# Patient Record
Sex: MALE | Race: WHITE | NOT HISPANIC OR LATINO
[De-identification: names, ages, dates, MRNs, and addresses within clinical notes are randomized per-mention and may not be internally consistent; named-entity substitution may affect disease eponyms.]

---

## 2020-02-04 ENCOUNTER — TRANSCRIPTION ENCOUNTER (OUTPATIENT)
Age: 25
End: 2020-02-04

## 2022-08-30 ENCOUNTER — APPOINTMENT (OUTPATIENT)
Dept: CARDIOLOGY | Facility: CLINIC | Age: 27
End: 2022-08-30

## 2022-08-30 VITALS
HEIGHT: 73 IN | SYSTOLIC BLOOD PRESSURE: 112 MMHG | TEMPERATURE: 97.7 F | OXYGEN SATURATION: 95 % | BODY MASS INDEX: 23.19 KG/M2 | HEART RATE: 63 BPM | DIASTOLIC BLOOD PRESSURE: 62 MMHG | WEIGHT: 175 LBS

## 2022-08-30 VITALS — DIASTOLIC BLOOD PRESSURE: 60 MMHG | SYSTOLIC BLOOD PRESSURE: 100 MMHG

## 2022-08-30 VITALS — SYSTOLIC BLOOD PRESSURE: 120 MMHG | DIASTOLIC BLOOD PRESSURE: 60 MMHG

## 2022-08-30 DIAGNOSIS — Z78.9 OTHER SPECIFIED HEALTH STATUS: ICD-10-CM

## 2022-08-30 DIAGNOSIS — Z13.6 ENCOUNTER FOR SCREENING FOR CARDIOVASCULAR DISORDERS: ICD-10-CM

## 2022-08-30 DIAGNOSIS — Z80.0 FAMILY HISTORY OF MALIGNANT NEOPLASM OF DIGESTIVE ORGANS: ICD-10-CM

## 2022-08-30 DIAGNOSIS — Z00.00 ENCOUNTER FOR GENERAL ADULT MEDICAL EXAMINATION W/OUT ABNORMAL FINDINGS: ICD-10-CM

## 2022-08-30 DIAGNOSIS — F12.90 CANNABIS USE, UNSPECIFIED, UNCOMPLICATED: ICD-10-CM

## 2022-08-30 PROCEDURE — 99204 OFFICE O/P NEW MOD 45 MIN: CPT | Mod: 25

## 2022-08-30 PROCEDURE — 93000 ELECTROCARDIOGRAM COMPLETE: CPT

## 2022-08-30 NOTE — PHYSICAL EXAM
[Well Developed] : well developed [Well Nourished] : well nourished [No Acute Distress] : no acute distress [No Carotid Bruit] : no carotid bruit [Normal S1, S2] : normal S1, S2 [No Murmur] : no murmur [No Rub] : no rub [No Gallop] : no gallop [Clear Lung Fields] : clear lung fields [Good Air Entry] : good air entry [No Respiratory Distress] : no respiratory distress  [Soft] : abdomen soft [Non Tender] : non-tender [No Masses/organomegaly] : no masses/organomegaly [Normal Bowel Sounds] : normal bowel sounds [Normal Gait] : normal gait [No Edema] : no edema [No Cyanosis] : no cyanosis [No Clubbing] : no clubbing [No Varicosities] : no varicosities [Normal Radial B/L] : normal radial B/L [Normal DP B/L] : normal DP B/L [Moves all extremities] : moves all extremities [Normal Speech] : normal speech [Alert and Oriented] : alert and oriented

## 2022-08-30 NOTE — DISCUSSION/SUMMARY
[FreeTextEntry1] : 26-year-old gentleman with above medical history active medical problems as noted below\par 1.  Postural dizziness.  Orthostatic blood pressure shifts.  Longstanding history.  Also element of vasovagal physiology.\par Reviewed pathophysiology with him at length.  And his mother.\par Recommended hydration.  Liberalization of the salt intake.\par Upper body activity exercise before getting up.\par Avoid quick changes in position\par If symptoms arrives lie down and relax till symptoms passes.\par Avoid alcohol marijuana as much as possible.\par We will have 7-day ZIO\par Echocardiogram to rule out structural heart disease.\par Carotid Doppler study to rule out any vertebral insufficiency.\par Exercise treadmill stress test to rule out any significant blood pressure heart rate shifts and arrhythmia.\par Labs.\par \par Reviewed all the above with them at length.  Answered all their questions.\par Counseling regarding low saturated fat, salt and carbohydrate intake was reviewed. Active lifestyle and regular. Exercise along with weight management is advised.\par All the above were at length reviewed. Answered all the questions. Thank you very much for this kind referral. Please do not hesitate to give me a call for any question.\par Part of this transcription was done with voice recognition software and phonetically similar errors are common. I apologize for that. Please do not hesitate to call for any questions due to above.\par \par Sincerely,\par Ab Bonilla MD,FACC,KIMBERLY\par

## 2022-08-30 NOTE — ASSESSMENT
[FreeTextEntry1] : Reviewed August 30, 2022\par EKG as noted above.\par Chest x-ray August 16, 2022.  No pleural effusion or focal consolidation.  Normal mediastinum.  Labs had shown stable CBC CMP.

## 2022-08-30 NOTE — REASON FOR VISIT
[Symptom and Test Evaluation] : symptom and test evaluation [Parent] : parent [FreeTextEntry3] : Dr. Ledy Bonilla [FreeTextEntry1] : 26-year-old white male is referred to me for consultation with longstanding history of postural dizziness and intermittent syncope.  He had about events over time.  Few weeks ago he passed out after waking up and getting up.  He did not injure himself.  He was mildly confused.  With contusion of left side of the head.  And right knee.\par He denied any palpitation.  No headache visual disturbances focal weakness\par According to him there is no relation to any meals or respiration.  He tries to hydrate himself and eats more salt.\par There is no relation to alcohol marijuana or caffeine.\par He is non-smoker\par He has this history for the last 8 years\par He has been doing free diving/.  Fishing recently without any syncopal event.\par He tries to do aggressive activity and exercise on a regular basis without any significant events.\par He has had intermittent right precordial chest pain.  Achiness.  Resolves on its own.  Nonexertional.\par No family history of premature coronary artery disease sudden cardiac death within the family history of vasovagal syncope's.\par No history of hypertension, diabetes mellitus, myocardial infarction, coronary artery disease, cerebrovascular accident, peripheral arterial disease, rheumatic fever, thyroid or Lyme disease.

## 2022-09-06 ENCOUNTER — APPOINTMENT (OUTPATIENT)
Dept: CARDIOLOGY | Facility: CLINIC | Age: 27
End: 2022-09-06

## 2022-09-06 PROCEDURE — 93880 EXTRACRANIAL BILAT STUDY: CPT

## 2022-09-06 PROCEDURE — 93306 TTE W/DOPPLER COMPLETE: CPT

## 2022-09-12 ENCOUNTER — APPOINTMENT (OUTPATIENT)
Dept: CARDIOLOGY | Facility: CLINIC | Age: 27
End: 2022-09-12

## 2022-09-16 ENCOUNTER — APPOINTMENT (OUTPATIENT)
Dept: CARDIOLOGY | Facility: CLINIC | Age: 27
End: 2022-09-16

## 2022-09-16 PROCEDURE — 93242 EXT ECG>48HR<7D RECORDING: CPT

## 2022-09-19 ENCOUNTER — APPOINTMENT (OUTPATIENT)
Dept: ORTHOPEDIC SURGERY | Facility: CLINIC | Age: 27
End: 2022-09-19
Payer: COMMERCIAL

## 2022-09-19 DIAGNOSIS — Z78.9 OTHER SPECIFIED HEALTH STATUS: ICD-10-CM

## 2022-09-19 DIAGNOSIS — M22.41 CHONDROMALACIA PATELLAE, RIGHT KNEE: ICD-10-CM

## 2022-09-19 DIAGNOSIS — M22.42 CHONDROMALACIA PATELLAE, LEFT KNEE: ICD-10-CM

## 2022-09-19 PROCEDURE — 73562 X-RAY EXAM OF KNEE 3: CPT | Mod: LT

## 2022-09-19 PROCEDURE — 99204 OFFICE O/P NEW MOD 45 MIN: CPT

## 2022-09-19 PROCEDURE — 73560 X-RAY EXAM OF KNEE 1 OR 2: CPT | Mod: RT

## 2022-09-19 NOTE — PHYSICAL EXAM
[4___] : hamstring 4[unfilled]/5 [] : patient ambulates without assistive device [Bilateral] : knee bilaterally [Lateral] : lateral [Loyal] : skyline [AP Standing] : anteroposterior standing [Outside films reviewed] : Outside films reviewed [There are no fractures, subluxations or dislocations. No significant abnormalities are seen] : There are no fractures, subluxations or dislocations. No significant abnormalities are seen [TWNoteComboBox7] : flexion 125 degrees [de-identified] : extension 0 degrees

## 2022-09-19 NOTE — HISTORY OF PRESENT ILLNESS
[de-identified] : Patient states he passed out about a month ago and fell onto his knees. Since the injury the pain has not subsided and is worse with twisting motions. The pain is greater in the LT than the RT. He note instability and weakness in the knees and is unable to surf, run or perform many activities that he was able to perform before the injury.

## 2022-09-23 ENCOUNTER — APPOINTMENT (OUTPATIENT)
Dept: CARDIOLOGY | Facility: CLINIC | Age: 27
End: 2022-09-23

## 2022-09-23 VITALS — DIASTOLIC BLOOD PRESSURE: 60 MMHG | SYSTOLIC BLOOD PRESSURE: 100 MMHG

## 2022-09-23 VITALS
SYSTOLIC BLOOD PRESSURE: 114 MMHG | WEIGHT: 176 LBS | OXYGEN SATURATION: 98 % | HEIGHT: 73 IN | TEMPERATURE: 97.7 F | RESPIRATION RATE: 12 BRPM | DIASTOLIC BLOOD PRESSURE: 76 MMHG | HEART RATE: 76 BPM | BODY MASS INDEX: 23.33 KG/M2

## 2022-09-23 DIAGNOSIS — R07.9 CHEST PAIN, UNSPECIFIED: ICD-10-CM

## 2022-09-23 DIAGNOSIS — I95.1 ORTHOSTATIC HYPOTENSION: ICD-10-CM

## 2022-09-23 DIAGNOSIS — R76.8 OTHER SPECIFIED ABNORMAL IMMUNOLOGICAL FINDINGS IN SERUM: ICD-10-CM

## 2022-09-23 DIAGNOSIS — R42 DIZZINESS AND GIDDINESS: ICD-10-CM

## 2022-09-23 DIAGNOSIS — R55 SYNCOPE AND COLLAPSE: ICD-10-CM

## 2022-09-23 PROCEDURE — 99214 OFFICE O/P EST MOD 30 MIN: CPT

## 2022-09-23 NOTE — PHYSICAL EXAM
[Well Developed] : well developed [Well Nourished] : well nourished [No Acute Distress] : no acute distress [Normal S1, S2] : normal S1, S2 [No Gallop] : no gallop [Clear Lung Fields] : clear lung fields [No Respiratory Distress] : no respiratory distress  [Normal Gait] : normal gait [No Edema] : no edema [No Cyanosis] : no cyanosis [No Clubbing] : no clubbing [No Varicosities] : no varicosities [Moves all extremities] : moves all extremities [Normal Speech] : normal speech [Alert and Oriented] : alert and oriented

## 2022-09-23 NOTE — ASSESSMENT
[FreeTextEntry1] : Reviewed August 30, 2022\par EKG as noted above.\par Chest x-ray August 16, 2022.  No pleural effusion or focal consolidation.  Normal mediastinum.  Labs had shown stable CBC CMP.\par \par Reviewed on September 23, 2022.\par Echocardiogram/carotid Doppler study reviewed.\par Holter monitor reviewed.\par Recent labs.  September 2, 2022 LDL 93 HDL 75 triglycerides 63 ESR 2 CRP level less than 0.04 TSH 2.04 YARELIS and Lyme studies were pending.

## 2022-09-23 NOTE — CARDIOLOGY SUMMARY
[de-identified] : Outside EKG from August 16, 2022 sinus bradycardia normal intervals [de-identified] : Event monitoring.  June 30, 2022.  Normal sinus rhythm.  Heart rate from  average around 73 rare ectopy.  Junctional beats.  PACs.  No significant sustained bradycardia or tachycardia. [de-identified] : September 6, 2022 EF 55 to 60% [de-identified] : Bilateral carotid Doppler study.  September 6, 2022.  Normal study

## 2022-09-23 NOTE — DISCUSSION/SUMMARY
[FreeTextEntry1] : 26-year-old gentleman with above medical history active medical problems as noted below\par 1.  Postural dizziness.  Orthostatic blood pressure shifts.  Longstanding history.  Also element of vasovagal physiology.\par Reviewed pathophysiology with him at length.  And his mother.\par Recommended hydration.  Liberalization of the salt intake.\par Upper body activity exercise before getting up.\par Avoid quick changes in position\par If symptoms arrives lie down and relax till symptoms passes.\par Avoid alcohol marijuana as much as possible.\par Reviewed testing which includes nonsustained atrial arrhythmias with extrasystoles on event monitor.\par Echocardiogram with preserved LV systolic function\par Carotid Doppler study no significant obstructive carotid or vertebral disease.\par Unable to do treadmill exercise at present because of knee injury.  He will do it as soon as his knee is stable\par \par Reviewed all the above with them at length.  Answered all their questions.  Limitation of tests reviewed.\par Considering improved symptoms with lifestyle modification.  Again reviewed above with him at length.\par He will see me after exercise treadmill stress test.\par He will see me if there is any worsening symptoms.\par If unable to manage his symptoms by lifestyle modification consider further evaluation which may include tilt table testing/medications like midodrine/Florinef and evaluation for autonomic dysfunction.\par He understands limitation of evaluation management and possibility of recurrent event associated with morbidity mortality.\par \par Counseling regarding low saturated fat, salt and carbohydrate intake was reviewed. Active lifestyle and regular. Exercise along with weight management is advised.\par All the above were at length reviewed. Answered all the questions. Thank you very much for this kind referral. Please do not hesitate to give me a call for any question.\par Part of this transcription was done with voice recognition software and phonetically similar errors are common. I apologize for that. Please do not hesitate to call for any questions due to above.\par \par Sincerely,\par Ab Bonilla MD,FAC,KIMBERLY\par

## 2022-09-23 NOTE — REASON FOR VISIT
[Symptom and Test Evaluation] : symptom and test evaluation [FreeTextEntry3] : Dr. eLdy Bonilla [FreeTextEntry1] : 26-year-old gentleman comes in for follow-up consultation to review his cardiovascular testing.  He was recently seen for longstanding history of postural dizziness and intermittent syncope.  He had about events over time.  Few weeks ago he passed out after waking up and getting up.  He did not injure himself.  He was mildly confused.  With contusion of left side of the head.  And right knee.\par He denied any palpitation.  No headache visual disturbances focal weakness\par According to him there is no relation to any meals or respiration.  He tries to hydrate himself and eats more salt.\par There is no relation to alcohol marijuana or caffeine.\par He is non-smoker\par He has this history for the last 8 years\par He has been doing free diving/.  Fishing recently without any syncopal event.\par He tries to do aggressive activity and exercise on a regular basis without any significant events.\par He has had intermittent right precordial chest pain.  Achiness.  Resolves on its own.  Nonexertional.\par No family history of premature coronary artery disease sudden cardiac death within the family history of vasovagal syncope's.\par No history of hypertension, diabetes mellitus, myocardial infarction, coronary artery disease, cerebrovascular accident, peripheral arterial disease, rheumatic fever, thyroid or Lyme disease.\par Since last seen on August 30, 2022 when seen today September 23, 2022 he has not had any syncopal event\par He has been increasing his fluid intake and salt intake\par He is avoiding standing for longer period of time\par He did not get the stress test done as he injured his knee recently.

## 2022-09-23 NOTE — ADDENDUM
[FreeTextEntry1] : Rest of the labs were obtained.  Lyme titers were negative.  YARELIS was mildly abnormal at 1-40.  Rheumatoid factor was negative CRP level and ESR were normal.\par We will repeat YARELIS titers again.  If abnormal recommend to follow with rheumatologist.

## 2022-11-09 ENCOUNTER — APPOINTMENT (OUTPATIENT)
Dept: ORTHOPEDIC SURGERY | Facility: CLINIC | Age: 27
End: 2022-11-09

## 2023-05-24 ENCOUNTER — NON-APPOINTMENT (OUTPATIENT)
Age: 28
End: 2023-05-24